# Patient Record
Sex: MALE
[De-identification: names, ages, dates, MRNs, and addresses within clinical notes are randomized per-mention and may not be internally consistent; named-entity substitution may affect disease eponyms.]

---

## 2018-01-31 ENCOUNTER — HOSPITAL ENCOUNTER (OUTPATIENT)
Dept: HOSPITAL 89 - AMB | Age: 38
End: 2018-01-31
Payer: COMMERCIAL

## 2018-01-31 ENCOUNTER — HOSPITAL ENCOUNTER (EMERGENCY)
Dept: HOSPITAL 89 - ER | Age: 38
Discharge: HOME | End: 2018-01-31
Payer: COMMERCIAL

## 2018-01-31 VITALS — HEIGHT: 70 IN | WEIGHT: 275 LBS | BODY MASS INDEX: 39.37 KG/M2

## 2018-01-31 VITALS — SYSTOLIC BLOOD PRESSURE: 132 MMHG | DIASTOLIC BLOOD PRESSURE: 77 MMHG

## 2018-01-31 DIAGNOSIS — R11.0: ICD-10-CM

## 2018-01-31 DIAGNOSIS — E11.65: ICD-10-CM

## 2018-01-31 DIAGNOSIS — E11.65: Primary | ICD-10-CM

## 2018-01-31 DIAGNOSIS — B34.9: Primary | ICD-10-CM

## 2018-01-31 LAB — PLATELET COUNT, AUTOMATED: 304 K/UL (ref 150–450)

## 2018-01-31 PROCEDURE — 36416 COLLJ CAPILLARY BLOOD SPEC: CPT

## 2018-01-31 PROCEDURE — 81001 URINALYSIS AUTO W/SCOPE: CPT

## 2018-01-31 PROCEDURE — 82947 ASSAY GLUCOSE BLOOD QUANT: CPT

## 2018-01-31 PROCEDURE — 82565 ASSAY OF CREATININE: CPT

## 2018-01-31 PROCEDURE — 84460 ALANINE AMINO (ALT) (SGPT): CPT

## 2018-01-31 PROCEDURE — 82009 KETONE BODYS QUAL: CPT

## 2018-01-31 PROCEDURE — 82040 ASSAY OF SERUM ALBUMIN: CPT

## 2018-01-31 PROCEDURE — 82374 ASSAY BLOOD CARBON DIOXIDE: CPT

## 2018-01-31 PROCEDURE — 82948 REAGENT STRIP/BLOOD GLUCOSE: CPT

## 2018-01-31 PROCEDURE — 85025 COMPLETE CBC W/AUTO DIFF WBC: CPT

## 2018-01-31 PROCEDURE — 74177 CT ABD & PELVIS W/CONTRAST: CPT

## 2018-01-31 PROCEDURE — 84075 ASSAY ALKALINE PHOSPHATASE: CPT

## 2018-01-31 PROCEDURE — 82247 BILIRUBIN TOTAL: CPT

## 2018-01-31 PROCEDURE — 84155 ASSAY OF PROTEIN SERUM: CPT

## 2018-01-31 PROCEDURE — 83930 ASSAY OF BLOOD OSMOLALITY: CPT

## 2018-01-31 PROCEDURE — 84520 ASSAY OF UREA NITROGEN: CPT

## 2018-01-31 PROCEDURE — 84450 TRANSFERASE (AST) (SGOT): CPT

## 2018-01-31 PROCEDURE — 99284 EMERGENCY DEPT VISIT MOD MDM: CPT

## 2018-01-31 PROCEDURE — 87502 INFLUENZA DNA AMP PROBE: CPT

## 2018-01-31 PROCEDURE — 71046 X-RAY EXAM CHEST 2 VIEWS: CPT

## 2018-01-31 PROCEDURE — 84295 ASSAY OF SERUM SODIUM: CPT

## 2018-01-31 PROCEDURE — 84132 ASSAY OF SERUM POTASSIUM: CPT

## 2018-01-31 PROCEDURE — 82310 ASSAY OF CALCIUM: CPT

## 2018-01-31 PROCEDURE — 82435 ASSAY OF BLOOD CHLORIDE: CPT

## 2018-01-31 NOTE — RADIOLOGY IMAGING REPORT
FACILITY: [facility] 

 

PATIENT NAME: Jamarcus Celeste

: 1980

MR: [id number]

V: [id number]

EXAM DATE: 

ORDERING PHYSICIAN: AMAIRANI MCMILLAN

TECHNOLOGIST: 

 

Location: [facility]

Patient: Jamarcus Celeste

: 1980

MRN: [id number]

Visit/Account:[id number]

Date of Sevice:  2018

 

ACCESSION #: [id number]

 

EXAMINATION:

CT abdomen with IV contrast

CT pelvis with IV contrast

 

HISTORY:   Abdominal distention, 2 weeks status post umbilical hernia repair.

 

COMPARISON:   None.

 

TECHNIQUE:  Axial images were taken through the abdomen and pelvis with intravenous contrast.  Sagitt
al and coronal reformatted images are also submitted.

CONTRAST:   75 mL of IV Isovue-370

 

One of the following dose optimization techniques was utilized in the performance of this exam: Autom
ated exposure control; adjustment of the mA and/or kV according to the patient's size; or use of an i
terative  reconstruction technique.  Specific details can be referenced in the facility's radiology C
T exam operational policy.

 

FINDINGS:

Liver/biliary: Mild diffuse hypoattenuation of the liver suggestive of fatty infiltration of the live
r. The gallbladder is unremarkable.

Pancreas: Negative.

Spleen: Negative.

Adrenal glands: Negative.

Kidneys: 2 subcentimeter hypoattenuating foci in the left kidney, too small to characterize.

Pelvic  structures: Negative.

 

Bowel: Negative. The bowel is normal caliber without obvious focal wall thickening. Normal appendix.

Peritoneum/retroperitoneum/mesenteries: Negative. No intraperitoneal free air or free fluid.

 

Vessels: Mild plaque of the aorta and iliac arteries.

 

Musculoskeletal/body wall: Surgical changes of umbilical hernia repair. Lumbosacral transitional [place]
ent, a lumbarized S1 segment. A few small bone islands in the pelvis and left femur.

 

Lymph node assessment: Negative.

 

Lower chest: Negative.

 

IMPRESSION:

No CT evidence of acute pathology in the abdomen or pelvis.

 

Fatty liver.

 

Two subcentimeter hypoattenuating foci in the left kidney, too small to characterize, probably cysts.


 

Report Dictated By: Pedro Luis Valle MD at 2018 1:58 PM

 

Report E-Signed By: Pedro Luis Valle MD  at 2018 2:08 PM

 

WSN:[id number]

## 2018-01-31 NOTE — ER REPORT
History and Physical


Time Seen By MD:  12:31


Hx. of Stated Complaint:  


PATIENT CALLED EMS BECAUSE HIS SUGAR WAS HIGH. HE IS REPORTING SHAKINESS AND 


NAUSEA


HPI/ROS


CHIEF COMPLAINT: Elevated blood sugar, shaky, nauseated.





HISTORY OF PRESENT ILLNESS: 37-year-old male patient presents to emergency room 

with complaint of elevated blood sugar, shaky and nauseated. Patient is a long-

 who has been feeling ill for the past 4 days. He states that 

today he seemed to feel significantly worse. He states he initially thought 

that it was due to the Mountain Dew. He states this been cutting back on that. 

He states that he thought he might be getting slightly better until today. He 

states he's not been febrile. He states that he has had some back pain but 

denies any cough. He states he is not taking any medication for this. He takes 

metformin 1 g twice a day for his diabetes. He states he went to bed last night 

his blood sugar was 300, when he woke up earlier today was 390. He states prior 

to calling EMS his blood sugar measured 450. Patient did have a recent hernia 

repair, January 9, and states he is felt like his stomach is swollen.





REVIEW OF SYSTEMS:


Respiratory: No cough, no dyspnea.


Cardiovascular: No chest pain, no palpitations.


Gastrointestinal: As noted above


Musculoskeletal: No back pain.


Allergies:  


Coded Allergies:  


     metoclopramide (Verified  Allergy, Unknown, 1/31/18)


 PATIENT REPORTS THAT HE BECAME PSYCHOTIC WHEN GIVEN REGLAN


Home Meds


Reported Medications


Atorvastatin Calcium (LIPITOR) 20 Mg Tablet, 1 TAB PO QDAY, TAB


   1/31/18


Metformin Hcl (METFORMIN HCL) 1,000 Mg Tablet, 1 TAB PO BID, TAB


   1/31/18


Trazodone Hcl (TRAZODONE HCL) 50 Mg Tablet, 50 MG PO QHS


   1/31/18


Past Medical/Surgical History


Patient has a past medical history of hypertension, hyperlipidemia, asthma, 

fractures, back pain, diabetes, depression.


Patient has surgical history of a umbilical hernia repair.


Reviewed Nurses Notes:  Yes


Constitutional





Vital Sign - Last 24 Hours








 1/31/18 1/31/18 1/31/18 1/31/18





 12:32 12:34 12:47 13:02


 


Temp  97.8  


 


Pulse 110 106 105 105


 


Resp  24  


 


B/P (MAP)  144/97  


 


Pulse Ox 92 91 91 92


 


O2 Delivery  Room Air  


 


    





 1/31/18 1/31/18 1/31/18 1/31/18





 13:32 13:37 13:41 13:52


 


Pulse 104 103  98


 


B/P (MAP)   147/81 (103) 


 


Pulse Ox 94 97  93





 1/31/18 1/31/18  





 14:00 14:07  


 


Pulse  101  


 


B/P (MAP) 132/77 (95)   


 


Pulse Ox  93  














Intake and Output   


 


 1/31/18 1/31/18 2/1/18





 15:00 23:00 07:00


 


Intake Total 1000 ml  


 


Balance 1000 ml  








Physical Exam


  General Appearance: The patient is alert, has no immediate need for airway 

protection and no current signs of toxicity.


ENT: Tympanic membranes are pearly-gray, auditory canals are patent, mucous 

membranes are moist.


Respiratory: Chest is non tender, lungs are clear to auscultation.


Cardiac: regular rate and rhythm


Gastrointestinal: Abdomen is soft and slightly tender in the periumbilical 

region, no masses, bowel sounds normal.


Musculoskeletal:  Neck: Neck is supple and non tender.


   Extremities have full range of motion and are non tender.


Skin: No rashes or lesions.





DIFFERENTIAL DIAGNOSIS: After history and physical exam differential diagnosis 

was considered for viral syndrome, pneumonia, it was there, postsurgical 

complication.





Medical Decision Making


Data Points


Result Diagram:  


1/31/18 1221                                                                   

             1/31/18 1221





Laboratory





Hematology








Test


  1/31/18


12:21 1/31/18


12:50 1/31/18


12:55


 


Red Blood Count


  5.60 M/uL


(4.00-5.60) 


  


 


 


Mean Corpuscular Volume


  89.3 fL


(80.0-96.0) 


  


 


 


Mean Corpuscular Hemoglobin


  32.0 pg


(26.0-33.0) 


  


 


 


Mean Corpuscular Hemoglobin


Concent 35.8 g/dL


(32.0-36.0) 


  


 


 


Red Cell Distribution Width


  12.8 %


(11.5-14.5) 


  


 


 


Mean Platelet Volume


  8.5 fL


(7.2-11.1) 


  


 


 


Neutrophils (%) (Auto)


  58.5 %


(39.4-72.5) 


  


 


 


Lymphocytes (%) (Auto)


  31.9 %


(17.6-49.6) 


  


 


 


Monocytes (%) (Auto)


  7.2 %


(4.1-12.4) 


  


 


 


Eosinophils (%) (Auto)


  1.8 %


(0.4-6.7) 


  


 


 


Basophils (%) (Auto)


  0.6 %


(0.3-1.4) 


  


 


 


Nucleated RBC Relative Count


(auto) 0.1 /100WBC 


  


  


 


 


Neutrophils # (Auto)


  6.5 K/uL


(2.0-7.4) 


  


 


 


Lymphocytes # (Auto)


  3.5 K/uL


(1.3-3.6) 


  


 


 


Monocytes # (Auto)


  0.8 K/uL


(0.3-1.0) 


  


 


 


Eosinophils # (Auto)


  0.2 K/uL


(0.0-0.5) 


  


 


 


Basophils # (Auto)


  0.1 K/uL


(0.0-0.1) 


  


 


 


Nucleated RBC Absolute Count


(auto) 0.01 K/uL 


  


  


 


 


Sodium Level


  138 mmol/L


(137-145) 


  


 


 


Potassium Level


  3.8 mmol/L


(3.5-5.0) 


  


 


 


Chloride Level


  98 mmol/L


() 


  


 


 


Carbon Dioxide Level


  25 mmol/L


(22-30) 


  


 


 


Blood Urea Nitrogen


  14 mg/dl


(9-21) 


  


 


 


Creatinine


  0.70 mg/dl


(0.66-1.25) 


  


 


 


Glomerular Filtration Rate


Calc > 60.0 


  


  


 


 


Random Glucose


  287 mg/dl


() 


  


 


 


Osmolality


  298 mOSM/K


(275-295) 


  


 


 


Calcium Level


  10.0 mg/dl


(8.4-10.2) 


  


 


 


Total Bilirubin


  0.7 mg/dl


(0.2-1.3) 


  


 


 


Aspartate Amino Transf


(AST/SGOT) 20 U/L (0-35) 


  


  


 


 


Alanine Aminotransferase


(ALT/SGPT) 38 U/L (0-56) 


  


  


 


 


Alkaline Phosphatase


  101 U/L


(0-126) 


  


 


 


Total Protein


  7.8 gm/dl


(6.3-8.2) 


  


 


 


Albumin


  4.4 g/dl


(3.5-5.0) 


  


 


 


Acetone, Qualitative Negative   


 


Influenza Virus Type A (PCR)


  


  Negative


(NEGATIVE) 


 


 


Influenza Virus Type B (PCR)


  


  Negative


(NEGATIVE) 


 


 


Urine Color   Yellow 


 


Urine Clarity   Clear 


 


Urine pH


  


  


  8.0 pH


(4.8-9.5)


 


Urine Specific Gravity   1.035 


 


Urine Protein


  


  


  Negative mg/dL


(NEGATIVE)


 


Urine Glucose (UA)


  


  


  500 mg/dL


(NEGATIVE)


 


Urine Ketones


  


  


  20 mg/dL


(NEGATIVE)


 


Urine Blood


  


  


  Small


(NEGATIVE)


 


Urine Nitrite


  


  


  Negative


(NEGATIVE)


 


Urine Bilirubin


  


  


  Negative


(NEGATIVE)


 


Urine Urobilinogen


  


  


  2.0 mg/dL


(0.2-1.9)


 


Urine Leukocyte Esterase


  


  


  Negative


(NEGATIVE)


 


Urine RBC


  


  


  1 /HPF


(0-2/HPF)


 


Urine WBC


  


  


  <1 /HPF


(0-5/HPF)


 


Urine Squamous Epithelial


Cells 


  


  None /LPF


(</=FEW)


 


Urine Bacteria


  


  


  Negative /HPF


(NONE-FEW)


 


Urine Mucus


  


  


  None /HPF


(NONE-FEW)








Chemistry








Test


  1/31/18


12:21 1/31/18


12:50 1/31/18


12:55


 


White Blood Count


  11.0 k/uL


(4.5-11.0) 


  


 


 


Red Blood Count


  5.60 M/uL


(4.00-5.60) 


  


 


 


Hemoglobin


  17.9 g/dL


(14.0-18.0) 


  


 


 


Hematocrit


  50.1 %


(42.0-52.0) 


  


 


 


Mean Corpuscular Volume


  89.3 fL


(80.0-96.0) 


  


 


 


Mean Corpuscular Hemoglobin


  32.0 pg


(26.0-33.0) 


  


 


 


Mean Corpuscular Hemoglobin


Concent 35.8 g/dL


(32.0-36.0) 


  


 


 


Red Cell Distribution Width


  12.8 %


(11.5-14.5) 


  


 


 


Platelet Count


  304 K/uL


(150-450) 


  


 


 


Mean Platelet Volume


  8.5 fL


(7.2-11.1) 


  


 


 


Neutrophils (%) (Auto)


  58.5 %


(39.4-72.5) 


  


 


 


Lymphocytes (%) (Auto)


  31.9 %


(17.6-49.6) 


  


 


 


Monocytes (%) (Auto)


  7.2 %


(4.1-12.4) 


  


 


 


Eosinophils (%) (Auto)


  1.8 %


(0.4-6.7) 


  


 


 


Basophils (%) (Auto)


  0.6 %


(0.3-1.4) 


  


 


 


Nucleated RBC Relative Count


(auto) 0.1 /100WBC 


  


  


 


 


Neutrophils # (Auto)


  6.5 K/uL


(2.0-7.4) 


  


 


 


Lymphocytes # (Auto)


  3.5 K/uL


(1.3-3.6) 


  


 


 


Monocytes # (Auto)


  0.8 K/uL


(0.3-1.0) 


  


 


 


Eosinophils # (Auto)


  0.2 K/uL


(0.0-0.5) 


  


 


 


Basophils # (Auto)


  0.1 K/uL


(0.0-0.1) 


  


 


 


Nucleated RBC Absolute Count


(auto) 0.01 K/uL 


  


  


 


 


Glomerular Filtration Rate


Calc > 60.0 


  


  


 


 


Osmolality


  298 mOSM/K


(275-295) 


  


 


 


Calcium Level


  10.0 mg/dl


(8.4-10.2) 


  


 


 


Total Bilirubin


  0.7 mg/dl


(0.2-1.3) 


  


 


 


Aspartate Amino Transf


(AST/SGOT) 20 U/L (0-35) 


  


  


 


 


Alanine Aminotransferase


(ALT/SGPT) 38 U/L (0-56) 


  


  


 


 


Alkaline Phosphatase


  101 U/L


(0-126) 


  


 


 


Total Protein


  7.8 gm/dl


(6.3-8.2) 


  


 


 


Albumin


  4.4 g/dl


(3.5-5.0) 


  


 


 


Acetone, Qualitative Negative   


 


Influenza Virus Type A (PCR)


  


  Negative


(NEGATIVE) 


 


 


Influenza Virus Type B (PCR)


  


  Negative


(NEGATIVE) 


 


 


Urine Color   Yellow 


 


Urine Clarity   Clear 


 


Urine pH


  


  


  8.0 pH


(4.8-9.5)


 


Urine Specific Gravity   1.035 


 


Urine Protein


  


  


  Negative mg/dL


(NEGATIVE)


 


Urine Glucose (UA)


  


  


  500 mg/dL


(NEGATIVE)


 


Urine Ketones


  


  


  20 mg/dL


(NEGATIVE)


 


Urine Blood


  


  


  Small


(NEGATIVE)


 


Urine Nitrite


  


  


  Negative


(NEGATIVE)


 


Urine Bilirubin


  


  


  Negative


(NEGATIVE)


 


Urine Urobilinogen


  


  


  2.0 mg/dL


(0.2-1.9)


 


Urine Leukocyte Esterase


  


  


  Negative


(NEGATIVE)


 


Urine RBC


  


  


  1 /HPF


(0-2/HPF)


 


Urine WBC


  


  


  <1 /HPF


(0-5/HPF)


 


Urine Squamous Epithelial


Cells 


  


  None /LPF


(</=FEW)


 


Urine Bacteria


  


  


  Negative /HPF


(NONE-FEW)


 


Urine Mucus


  


  


  None /HPF


(NONE-FEW)








Toxicology








Test


  1/31/18


12:21


 


Acetone, Qualitative Negative 








Urinalysis








Test


  1/31/18


12:55


 


Urine Color Yellow 


 


Urine Clarity Clear 


 


Urine pH


  8.0 pH


(4.8-9.5)


 


Urine Specific Gravity 1.035 


 


Urine Protein


  Negative mg/dL


(NEGATIVE)


 


Urine Glucose (UA)


  500 mg/dL


(NEGATIVE)


 


Urine Ketones


  20 mg/dL


(NEGATIVE)


 


Urine Blood


  Small


(NEGATIVE)


 


Urine Nitrite


  Negative


(NEGATIVE)


 


Urine Bilirubin


  Negative


(NEGATIVE)


 


Urine Urobilinogen


  2.0 mg/dL


(0.2-1.9)


 


Urine Leukocyte Esterase


  Negative


(NEGATIVE)


 


Urine RBC


  1 /HPF


(0-2/HPF)


 


Urine WBC


  <1 /HPF


(0-5/HPF)


 


Urine Squamous Epithelial


Cells None /LPF


(</=FEW)


 


Urine Bacteria


  Negative /HPF


(NONE-FEW)


 


Urine Mucus


  None /HPF


(NONE-FEW)











EKG/Imaging


Imaging


EXAMINATION:


CT abdomen with IV contrast


CT pelvis with IV contrast


 


HISTORY:   Abdominal distention, 2 weeks status post umbilical hernia repair.


 


COMPARISON:   None.


 


TECHNIQUE:  Axial images were taken through the abdomen and pelvis with 

intravenous contrast.  Sagittal and coronal reformatted images are also 

submitted.


CONTRAST:   75 mL of IV Isovue-370


 


One of the following dose optimization techniques was utilized in the 

performance of this exam: Automated exposure control; adjustment of the mA and/

or kV according to the patient's size; or use of an iterative  reconstruction 

technique.  Specific details can be referenced in the facility's radiology CT 

exam operational policy.


 


FINDINGS:


Liver/biliary: Mild diffuse hypoattenuation of the liver suggestive of fatty 

infiltration of the liver. The gallbladder is unremarkable.


Pancreas: Negative.


Spleen: Negative.


Adrenal glands: Negative.


Kidneys: 2 subcentimeter hypoattenuating foci in the left kidney, too small to 

characterize.


Pelvic  structures: Negative.


 


Bowel: Negative. The bowel is normal caliber without obvious focal wall 

thickening. Normal appendix.


Peritoneum/retroperitoneum/mesenteries: Negative. No intraperitoneal free air 

or free fluid.


 


Vessels: Mild plaque of the aorta and iliac arteries.


 


Musculoskeletal/body wall: Surgical changes of umbilical hernia repair. 

Lumbosacral transitional element, a lumbarized S1 segment. A few small bone 

islands in the pelvis and left femur.


 


Lymph node assessment: Negative.


 


Lower chest: Negative.


 


IMPRESSION:


No CT evidence of acute pathology in the abdomen or pelvis.


 


Fatty liver.


 


Two subcentimeter hypoattenuating foci in the left kidney, too small to 

characterize, probably cysts.


 


Report Dictated By: Pedro Luis Valle MD at 1/31/2018 1:58 PM


 


Report E-Signed By: Pedro Luis Valle MD  at 1/31/2018 2:08 PM





2 VIEWS CHEST


 


INDICATION: Shaky, nausea and cough


 


COMPARISON: None available


 


FINDINGS:


Heart size within normal limits.


Lungs are clear without focal infiltrate or consolidation


There is no pneumothorax or pleural effusion.


No acute bony finding


 


IMPRESSION:


1. No acute cardiopulmonary process.


 


Report Dictated By: Reji Conroy MD at 1/31/2018 1:52 PM


 


Report E-Signed By: Reji Conroy MD  at 1/31/2018 1:53 PM





ED Course/Re-evaluation


ED Course


Patient was admitted to exam room, history and physical were obtained. 

Differential diagnoses were considered. On examination patient did have some 

tenderness to his abdomen. With his recent history of hernia surgery we'll go 

ahead and do his CAT scan of the abdomen and pelvis. We will do a CBC, CMP, 

urinalysis, osmolality, acetone to make sure he is not developing a DKA. We'll 

do a chest x-ray looking for pneumonia. Influenza screen was also done. The 

labs were unremarkable, patient had a blood sugar of 297. CT scan and x-ray 

were unremarkable. I discussed the findings with the patient. Patient received 

a liter of LR here in the emergency room.  Rechecking his blood sugar was down 

to 191. I believe we are looking at a viral illness. We'll go ahead and 

discharge patient home. He states Tylenol ibuprofen C for fever or pain. He is 

to increase fluid intake. He is to monitor his diet. He is to not take his 

metformin for the next 72 hours. Patient verbalized understanding and agreement 

with plan.


Decision to Disposition Date:  Jan 31, 2018


Decision to Disposition Time:  14:24





Depart


Departure


Latest Vital Signs





Vital Signs








  Date Time  Temp Pulse Resp B/P (MAP) Pulse Ox O2 Delivery O2 Flow Rate FiO2


 


1/31/18 14:07  101   93   


 


1/31/18 14:00    132/77 (95)    


 


1/31/18 12:34 97.8  24   Room Air  








Impression:  


 Primary Impression:  


 Viral syndrome


 Additional Impression:  


 Hyperglycemia


Condition:  Improved


Disposition:  HOME OR SELF-CARE


Patient Instructions:  Viral Syndrome (ED)





Additional Instructions:  


Increase water intake.


Watch your diet.


Follow up with your primary care provider in the next 1-2 weeks when you return 

home.


Continue with current medications, except for the metformin for the next 3 days.


Make sure that you get out and exercise when you are able.


Get plenty of rest.


Take Tylenol or Ibuprofen as needed for fevers or pain.


Return to the ER if condition worsens.





Problem Qualifiers











AMAIRANI MCMILLAN Jan 31, 2018 12:42

## 2018-01-31 NOTE — RADIOLOGY IMAGING REPORT
FACILITY: [facility] 

 

PATIENT NAME: Jamarcus Celeste

: 1980

MR: [id number]

V: [id number]

EXAM DATE: 

ORDERING PHYSICIAN: AMAIRANI MCMILLAN

TECHNOLOGIST: 

 

Location: [facility] [place]

Patient: Jamarcus Celeste

: 1980

MRN: [id number]

Visit/Account:[id number]

Date of Sevice:  2018

 

ACCESSION #: [id number]

 

2 VIEWS CHEST

 

INDICATION: Shaky, nausea and cough

 

COMPARISON: None available

 

FINDINGS:

Heart size within normal limits.

Lungs are clear without focal infiltrate or consolidation

There is no pneumothorax or pleural effusion.

No acute bony finding

 

IMPRESSION:

1. No acute cardiopulmonary process.

 

Report Dictated By: Reji Conroy MD at 2018 1:52 PM

 

Report E-Signed By: Reji Conroy MD  at 2018 1:53 PM

 

WSN:[id number]